# Patient Record
Sex: FEMALE
[De-identification: names, ages, dates, MRNs, and addresses within clinical notes are randomized per-mention and may not be internally consistent; named-entity substitution may affect disease eponyms.]

---

## 2024-11-22 ENCOUNTER — DASHBOARD ENCOUNTERS (OUTPATIENT)
Age: 49
End: 2024-11-22

## 2024-12-04 ENCOUNTER — OFFICE VISIT (OUTPATIENT)
Dept: RURAL CLINIC 2 | Facility: CLINIC | Age: 49
End: 2024-12-04

## 2024-12-04 RX ORDER — PHENTERMINE HYDROCHLORIDE 15 MG/1
TAKE 1 CAPSULE BY MOUTH EVERY DAY IN THE MORNING CAPSULE ORAL
Qty: 30 EACH | Refills: 0 | Status: ACTIVE | COMMUNITY

## 2024-12-04 RX ORDER — CELECOXIB 200 MG/1
TAKE 1 CAPSULE (200 MG TOTAL) BY MOUTH IN THE MORNING AND IN THE EVENING CAPSULE ORAL
Qty: 60 EACH | Refills: 0 | Status: ACTIVE | COMMUNITY

## 2024-12-17 ENCOUNTER — OFFICE VISIT (OUTPATIENT)
Dept: RURAL CLINIC 2 | Facility: CLINIC | Age: 49
End: 2024-12-17

## 2024-12-17 RX ORDER — CELECOXIB 200 MG/1
TAKE 1 CAPSULE (200 MG TOTAL) BY MOUTH IN THE MORNING AND IN THE EVENING CAPSULE ORAL
Qty: 60 EACH | Refills: 0 | Status: ACTIVE | COMMUNITY

## 2024-12-17 RX ORDER — PHENTERMINE HYDROCHLORIDE 15 MG/1
TAKE 1 CAPSULE BY MOUTH EVERY DAY IN THE MORNING CAPSULE ORAL
Qty: 30 EACH | Refills: 0 | Status: ACTIVE | COMMUNITY

## 2024-12-30 ENCOUNTER — OFFICE VISIT (OUTPATIENT)
Dept: RURAL CLINIC 2 | Facility: CLINIC | Age: 49
End: 2024-12-30
Payer: COMMERCIAL

## 2024-12-30 ENCOUNTER — LAB OUTSIDE AN ENCOUNTER (OUTPATIENT)
Dept: RURAL CLINIC 2 | Facility: CLINIC | Age: 49
End: 2024-12-30

## 2024-12-30 VITALS
HEIGHT: 63 IN | HEART RATE: 82 BPM | TEMPERATURE: 97.1 F | BODY MASS INDEX: 28.6 KG/M2 | DIASTOLIC BLOOD PRESSURE: 73 MMHG | WEIGHT: 161.4 LBS | SYSTOLIC BLOOD PRESSURE: 116 MMHG

## 2024-12-30 DIAGNOSIS — R16.0 HEPATOMEGALY: ICD-10-CM

## 2024-12-30 DIAGNOSIS — R10.11 RIGHT UPPER QUADRANT PAIN: ICD-10-CM

## 2024-12-30 PROBLEM — 301717006: Status: ACTIVE | Noted: 2024-12-30

## 2024-12-30 PROBLEM — 80515008: Status: ACTIVE | Noted: 2024-12-30

## 2024-12-30 PROCEDURE — 99243 OFF/OP CNSLTJ NEW/EST LOW 30: CPT | Performed by: INTERNAL MEDICINE

## 2024-12-30 PROCEDURE — 99203 OFFICE O/P NEW LOW 30 MIN: CPT | Performed by: INTERNAL MEDICINE

## 2024-12-30 RX ORDER — CELECOXIB 200 MG/1
TAKE 1 CAPSULE (200 MG TOTAL) BY MOUTH IN THE MORNING AND IN THE EVENING CAPSULE ORAL
Qty: 60 EACH | Refills: 0 | Status: DISCONTINUED | COMMUNITY

## 2024-12-30 RX ORDER — PHENTERMINE HYDROCHLORIDE 15 MG/1
TAKE 1 CAPSULE BY MOUTH EVERY DAY IN THE MORNING CAPSULE ORAL
Qty: 30 EACH | Refills: 0 | Status: DISCONTINUED | COMMUNITY

## 2024-12-30 NOTE — HPI-TODAY'S VISIT:
The pt is a 50 y/o female who presents on referral from [facility] for hepatomegaly. A copy of this document to be sent to the referring provider. The pt underwent a Ct scan of the abdomen after a fall on her right side of her ribcage with incidental findings of hepatomegaly,  normal biliary ducts, no liver lesions and otherwise unremarkable. She completed [facility] serology Lab work showing normal  iron studies, ferritin, GTT, LFTs, ASMA, and BYRON levels. Hepatitis A, B  and C nonreactive. Social history is negative for alcohol use and family history is negative for liver disease. Right sided pain continues and is however slowly improving.